# Patient Record
(demographics unavailable — no encounter records)

---

## 2025-04-16 NOTE — DATA REVIEWED
[FreeTextEntry1] : BREAST PATH/RAD REVIEW. [facility]. 9/16/21 BL SC MG/US (TC 31.0%): birads 2. HD. Few b/l scattered benign calcs. L 1N3 & 1:00-RA sub-cm cyst clusters.  4/15/22 Breast MRI: Birads 1.  3/7/23 BL SC MG/US (TC 34.8%): birads 2. HD. L breast w/ multiple cysts up to 0.4 cm.  3/11/24 BL SC MG/US (TC 36.3%): birads 2. HD. Stable L 1N3 2.2 cm complicated cyst/cyst cluster.  3/4/25 BL SC MG/US (TC 30.7%): birads 2. HD. Stable L 1N3 0.5 cm cyst.

## 2025-04-16 NOTE — ASSESSMENT
[FreeTextEntry1] : RENETTA CARRILLO is a 44 y/o F with significant Fhx of breast cancer who presents for risk assessment.    We discussed her significant family history of breast cancer in conjunction with her increased breast density and I calculated her Tyrer-Chacho lifetime risk of 24.2%. 10 yr risk 4.6%.   We discussed that she meets criteria for high-risk screening and should have an annual breast MRI in addition to her annual mammogram. She will be referred to the Breast wellness and cancer prevention program for high-risk surveillance and additional risk reduction counseling.   Referred to breast wellness program.  Next imaging:  Breast MRI to be done in 6-months (9/2025), an anxiolytic will be ordered.  RTO in September 2025 after imaging.  She knows to return sooner if any breast abnormalities or if any breast issues/concerns arise

## 2025-04-16 NOTE — CONSULT LETTER
[Dear  ___] : Dear  [unfilled], [Consult Letter:] : I had the pleasure of evaluating your patient, [unfilled]. [Please see my note below.] : Please see my note below. [Sincerely,] : Sincerely, [FreeTextEntry3] : Sylvia A. Reyes, M.D., MBS, FACS Breast Surgery Division of Surgical Oncology [address] [phone]

## 2025-04-16 NOTE — CONSULT LETTER
[Dear  ___] : Dear  [unfilled], [Consult Letter:] : I had the pleasure of evaluating your patient, [unfilled]. [Please see my note below.] : Please see my note below. [Sincerely,] : Sincerely, [FreeTextEntry3] : Sylvia A. Reyes, M.D., MBS, FACS Breast Surgery Division of Surgical Oncology [address] [phone]

## 2025-04-16 NOTE — ASSESSMENT
[FreeTextEntry1] : RENETTA CARRILLO is a 46 y/o F with significant Fhx of breast cancer who presents for risk assessment.    We discussed her significant family history of breast cancer in conjunction with her increased breast density and I calculated her Tyrer-Chacho lifetime risk of 24.2%. 10 yr risk 4.6%.   We discussed that she meets criteria for high-risk screening and should have an annual breast MRI in addition to her annual mammogram. She will be referred to the Breast wellness and cancer prevention program for high-risk surveillance and additional risk reduction counseling.   Referred to breast wellness program.  Next imaging:  Breast MRI to be done in 6-months (9/2025), an anxiolytic will be ordered.  RTO in September 2025 after imaging.  She knows to return sooner if any breast abnormalities or if any breast issues/concerns arise

## 2025-04-16 NOTE — PHYSICAL EXAM
[Normocephalic] : normocephalic [Atraumatic] : atraumatic [Supple] : supple [No Supraclavicular Adenopathy] : no supraclavicular adenopathy [No Cervical Adenopathy] : no cervical adenopathy [Examined in the supine and seated position] : examined in the supine and seated position [No Axillary Lymphadenopathy] : no left axillary lymphadenopathy [Full ROM] : full range of motion [No Rashes] : no rashes [No Ulceration] : no ulceration [de-identified] :  Normal respiratory effort [de-identified] :  no palpable masses in either breast. no clinical lymphadenopathy

## 2025-04-16 NOTE — PHYSICAL EXAM
[Normocephalic] : normocephalic [Atraumatic] : atraumatic [Supple] : supple [No Supraclavicular Adenopathy] : no supraclavicular adenopathy [No Cervical Adenopathy] : no cervical adenopathy [Examined in the supine and seated position] : examined in the supine and seated position [No Axillary Lymphadenopathy] : no left axillary lymphadenopathy [Full ROM] : full range of motion [No Rashes] : no rashes [No Ulceration] : no ulceration [de-identified] :  Normal respiratory effort [de-identified] :  no palpable masses in either breast. no clinical lymphadenopathy

## 2025-04-16 NOTE — DATA REVIEWED
[FreeTextEntry1] : BREAST PATH/RAD REVIEW. [facility]. 9/16/21 BL SC MG/US (TC 31.0%): birads 2. HD. Few b/l scattered benign calcs. L 1N3 & 1:00-RA sub-cm cyst clusters.  4/15/22 Breast MRI: Birads 1.  3/7/23 BL SC MG/US (TC 34.8%): birads 2. HD. L breast w/ multiple cysts up to 0.4 cm.  3/11/24 BL SC MG/US (TC 36.3%): birads 2. HD. Stable L 1N3 2.2 cm complicated cyst/cyst cluster.  3/4/25 BL SC MG/US (TC 30.7%): birads 2. HD. Stable L 1N3 0.5 cm cyst.

## 2025-04-16 NOTE — CONSULT LETTER
[Dear  ___] : Dear  [unfilled], [Consult Letter:] : I had the pleasure of evaluating your patient, [unfilled]. [Please see my note below.] : Please see my note below. [Sincerely,] : Sincerely, [FreeTextEntry3] : Sylvia A. Reyes, M.D., MBS, FACS Breast Surgery Division of Surgical Oncology [address] [phone]

## 2025-04-16 NOTE — HISTORY OF PRESENT ILLNESS
[FreeTextEntry1] : RENETTA CARRILLO is a 46 y/o F with significant Fhx of breast cancer who presents for risk assessment.   Referred by: Dr. Emili Weldon (GYN)   Had personal genetic testing done 24 years ago- BRCA1/2 - Negative. And underwent updated panel testing 8-10 years ago that was negative per patient when she learnt that her sister had the ESTHER mutation.  States she does not have a copy of genetics report.   She denies any palpable breast masses, nipple discharge or skin changes.  PMHx: Stressed induced palpations.   PSHx: Denies.  Meds: Junel, propranolol 120 QD.  NKDA Family Hx: Breast cancer (Mother diagnosed at age 48 passed at 49, Maternal aunt passed in her late 30s, maternal aunt diagnosed in her early 60's - no surgery s/p radiation). Skin cancer (Father) GYN: , menarche age 14, LMP 25. Age at first pregnancy 21.  Y (6 weeks for both children).  Hx of OCP use in her 20's, stopped to conceive. Currently on birth control for the last 8 years.  Bra size: 34D  Occupation: Teacher.  Social: Smoking: Denies        ETOH: Denies

## 2025-04-16 NOTE — DATA REVIEWED
[FreeTextEntry1] : BREAST PATH/RAD REVIEW. [facility]. 9/16/21 BL SC MG/US (TC 31.0%): birads 2. HD. Few b/l scattered benign calcs. L 1N3 & 1:00-RA sub-cm cyst clusters.  4/15/22 Breast MRI: Birads 1.  3/7/23 BL SC MG/US (TC 34.8%): birads 2. HD. L breast w/ multiple cysts up to 0.4 cm.  3/11/24 BL SC MG/US (TC 36.3%): birads 2. HD. Stable L 1N3 2.2 cm complicated cyst/cyst cluster.  3/4/25 BL SC MG/US (TC 30.7%): birads 2. HD. Stable L 1N3 0.5 cm cyst.

## 2025-04-16 NOTE — PHYSICAL EXAM
[Normocephalic] : normocephalic [Atraumatic] : atraumatic [Supple] : supple [No Supraclavicular Adenopathy] : no supraclavicular adenopathy [No Cervical Adenopathy] : no cervical adenopathy [Examined in the supine and seated position] : examined in the supine and seated position [No Axillary Lymphadenopathy] : no left axillary lymphadenopathy [Full ROM] : full range of motion [No Rashes] : no rashes [No Ulceration] : no ulceration [de-identified] :  Normal respiratory effort [de-identified] :  no palpable masses in either breast. no clinical lymphadenopathy

## 2025-04-16 NOTE — HISTORY OF PRESENT ILLNESS
[FreeTextEntry1] : RENETTA CARRILLO is a 44 y/o F with significant Fhx of breast cancer who presents for risk assessment.   Referred by: Dr. Emili Weldon (GYN)   Had personal genetic testing done 24 years ago- BRCA1/2 - Negative. And underwent updated panel testing 8-10 years ago that was negative per patient when she learnt that her sister had the ESTHER mutation.  States she does not have a copy of genetics report.   She denies any palpable breast masses, nipple discharge or skin changes.  PMHx: Stressed induced palpations.   PSHx: Denies.  Meds: Junel, propranolol 120 QD.  NKDA Family Hx: Breast cancer (Mother diagnosed at age 48 passed at 49, Maternal aunt passed in her late 30s, maternal aunt diagnosed in her early 60's - no surgery s/p radiation). Skin cancer (Father) GYN: , menarche age 14, LMP 25. Age at first pregnancy 21.  Y (6 weeks for both children).  Hx of OCP use in her 20's, stopped to conceive. Currently on birth control for the last 8 years.  Bra size: 34D  Occupation: Teacher.  Social: Smoking: Denies        ETOH: Denies

## 2025-05-08 NOTE — ASSESSMENT
[FreeTextEntry1] : RENETTA CARRILLO presented today to review her risk of developing of breast cancer based on her family history and personal risk factors.  A complete risk assessment was performed and the results suggest that .Ms. CARRILLO   is at increased risk for breast cancer.  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 - continue f/up with gynecologist and PCP  - f/up with Breast Wellness Program twice a year - Next visit January 2026  CONTRACEPTIVE USE: In 2017, a large prospective [place] study reported breast cancer risks associated with more recent formulations of oral contraceptives . Overall, women who were using or had recently stopped using oral combined hormone contraceptives had a modest (about 20%) increase in the risk of breast cancer compared with women who had never used oral contraceptives. The risk increase varied from 0% to 60%, depending on the specific type of oral combined hormone contraceptive. The risk of breast cancer also increased the longer oral contraceptives were used   We discussed the limitations of these trials including that they may have utilized older, higher dose birth control pills; However, given this potential increased associated of OCPs with breast cancer risk, this patient advised to limit use of OCPs to the minimum time necessary to manage gynecologic issues and/or contraception use.  References: Terra LS, Paco CW, Delonte BROWN, et al. Contemporary hormonal contraception and the risk of breast cancer. [place] Journal of Medicine 2017; 377(23):7789-0125.  Hormonal Contraception and Risk of Breast Cancer: Practice Advisory - January 2018 https://[email or web address].org/clinical/clinical-guidance/practice-advisory/articles/[email or web address]/01/hormonal-contraception-and-risk-of-breast-cancer  Patient had an opportunity to have her questions asked and answered to her satisfaction.

## 2025-05-08 NOTE — HISTORY OF PRESENT ILLNESS
[de-identified] : RENETTA CARRILLO is a 44 y/o F with significant Fhx of breast cancer who presents for risk assessment.   Referred by: Dr. Reyes  Had personal genetic testing done in  years ago panel testing with [employer] that was negative per patient when she learnt that her sister had the ESTHER mutation.  Report in [employer]  She denies any palpable breast masses, nipple discharge or skin changes.  PMHx: Stressed induced palpations.   PSHx: Denies.  Meds: Junel, propranolol 120 QD.  NKDA Family Hx: Breast cancer (Mother diagnosed at age 48 passed at 49, Maternal aunt passed in her late 30s, maternal aunt diagnosed in her early 60's - no surgery s/p radiation). Skin cancer (Father) GYN: , menarche age 14, LMP 25. Age at first pregnancy 21.  Y (6 weeks for both children).  Hx of OCP use in her 20's, stopped to conceive. Currently on birth control for the last 8 years.  Bra size: 34D  Occupation: Teacher.  Social: Smoking: Denies        ETOH: Denies   PROVIDERS: GYN: Emili Weldon MD Breast Surgeon: Dr. Sylvia Reyes [de-identified] : 5/8/2025 RENETTA  denies any breast masses, breast tenderness, skin changes or nipple discharge.

## 2025-05-08 NOTE — CONSULT LETTER
[Dear  ___] : Dear  [unfilled], [Consult Letter:] : I had the pleasure of evaluating your patient, [unfilled]. [Please see my note below.] : Please see my note below. [Consult Closing:] : Thank you very much for allowing me to participate in the care of this patient.  If you have any questions, please do not hesitate to contact me. [Sincerely,] : Sincerely, [DrJj  ___] : Dr. CHEUNG [FreeTextEntry3] : Mira Carbajal PA-C Breast Wellness and Cancer Prevention Program Division of Hematology/[place] [address] Tel: [phone] Fax: [phone]

## 2025-05-08 NOTE — CONSULT LETTER
[Dear  ___] : Dear  [unfilled], [Consult Letter:] : I had the pleasure of evaluating your patient, [unfilled]. [Please see my note below.] : Please see my note below. [Consult Closing:] : Thank you very much for allowing me to participate in the care of this patient.  If you have any questions, please do not hesitate to contact me. [Sincerely,] : Sincerely, [DrJj  ___] : Dr. CHEUNG [FreeTextEntry3] : Mira Carbajal PA-C Breast Wellness and Cancer Prevention Program Division of Hematology/[place] [address] Tel: [phone] Fax: [phone]

## 2025-05-08 NOTE — ASSESSMENT
[FreeTextEntry1] : RENETTA CARRILLO presented today to review her risk of developing of breast cancer based on her family history and personal risk factors.  A complete risk assessment was performed and the results suggest that .Ms. CARRILLO   is at increased risk for breast cancer.  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 - continue f/up with gynecologist and PCP  - f/up with Breast Wellness Program twice a year - Next visit January 2026  CONTRACEPTIVE USE: In 2017, a large prospective [place] study reported breast cancer risks associated with more recent formulations of oral contraceptives . Overall, women who were using or had recently stopped using oral combined hormone contraceptives had a modest (about 20%) increase in the risk of breast cancer compared with women who had never used oral contraceptives. The risk increase varied from 0% to 60%, depending on the specific type of oral combined hormone contraceptive. The risk of breast cancer also increased the longer oral contraceptives were used   We discussed the limitations of these trials including that they may have utilized older, higher dose birth control pills; However, given this potential increased associated of OCPs with breast cancer risk, this patient advised to limit use of OCPs to the minimum time necessary to manage gynecologic issues and/or contraception use.  References: Terra LS, Paco CW, Delonte BROWN, et al. Contemporary hormonal contraception and the risk of breast cancer. [place] Journal of Medicine 2017; 377(23):3117-1237.  Hormonal Contraception and Risk of Breast Cancer: Practice Advisory - January 2018 https://www.acog.org/clinical/clinical-guidance/practice-advisory/articles/2018/01/hormonal-contraception-and-risk-of-breast-cancer  Patient had an opportunity to have her questions asked and answered to her satisfaction.

## 2025-05-08 NOTE — HISTORY OF PRESENT ILLNESS
[de-identified] : RENETTA CARRILLO is a 46 y/o F with significant Fhx of breast cancer who presents for risk assessment.   Referred by: Dr. Reyes  Had personal genetic testing done in  years ago panel testing with [employer] that was negative per patient when she learnt that her sister had the ESTHER mutation.  Report in [employer]  She denies any palpable breast masses, nipple discharge or skin changes.  PMHx: Stressed induced palpations.   PSHx: Denies.  Meds: Junel, propranolol 120 QD.  NKDA Family Hx: Breast cancer (Mother diagnosed at age 48 passed at 49, Maternal aunt passed in her late 30s, maternal aunt diagnosed in her early 60's - no surgery s/p radiation). Skin cancer (Father) GYN: , menarche age 14, LMP 25. Age at first pregnancy 21.  Y (6 weeks for both children).  Hx of OCP use in her 20's, stopped to conceive. Currently on birth control for the last 8 years.  Bra size: 34D  Occupation: Teacher.  Social: Smoking: Denies        ETOH: Denies   PROVIDERS: GYN: Emili Weldon MD Breast Surgeon: Dr. Sylvia Reyes [de-identified] : 5/8/2025 RENETTA  denies any breast masses, breast tenderness, skin changes or nipple discharge.